# Patient Record
(demographics unavailable — no encounter records)

---

## 2024-11-11 NOTE — ASSESSMENT
[FreeTextEntry1] :  recommendations:  -discussed Nutrition focus of WF/PB for optimal calorie reduction- to improve overall health parameters.  -discussed avoidance of oils/fats and sugars/artificial sweeteners -avoid processed/refined carbs -minimize animal products -have 3 distinct meals, eat to fullness, avoid snacking in between or after dinner -majority of calories to be earlier in the day- d/w her to have earlier window of eating  (9-6p, instead of 1p-10pm) -goal water intake - 64 oz per day -goal physical activity- moderate activity  45min 4x/week w/ strength training- meeting goal -adequate sleep (7+ hours, uninterrupted) was emphasized- needs better sleep hygiene, had NEG sleep study - reviewed metabolic labs- +HLD - prescribe Zepbound 2.5mg weekly injection x 4 weeks. - Patient had been counseled on the risk of a rare thyroid medullary cancer and pancreatic cancer in rodents in GLP-1 studies.  There are no FDA recommended monitoring labs/images for these risks. Currently there are no contraindications for the use of  GLP-1 medication after reviewing pt's medical history and labs including personal or family history of thyroid cancer or MEN2. Possible side effects were discussed with the patient including nausea, reflux, constipation, delayed gastric emptying, or pancreatitis. Patient accepts risks and continue treatment. - Given pt's h/o w/ GERD and possibly IBS- GLP-1 may exacerbate symptoms. She's been encouraged to f/u w/ GI and pursue EGD as was recommended to her previously. Needs to c/w PPI and monitor for any worsening symptoms. If she does have worsening symptoms that become intolerable, she will have to discontinue any GLP-1 medication. - She's also been counseled to reduce intake of ETOH to not more than 1x/week given increased risk of pancreatitis in combination w/ GLP-1. - She's been made aware that her insurance may not cover this med, an alternative would be Metformin in combination w/ above lifestyle changes.    Return to [facility] clinic in 4-6 weeks .

## 2024-11-11 NOTE — HISTORY OF PRESENT ILLNESS
[FreeTextEntry1] :   PATIENT WAS NOTIFIED THAT ANYTHING WE DISCUSSED IN OUR MEETING TODAY MAY BE REFLECTED IN WRITING IN THE VISIT NOTE WHICH WILL BE AVAILABLE TO OTHER MEDICAL PROVIDERS TO REVIEW AS PART OF ROUTINE PATIENT CARE.  PATIENT VERBALLY AGREED.   43 year old female who presents for evaluation and treatment of obesity. Intersted to manage her wt.  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 LOST, working out   Obesity began in ___ .  Weight gain has occurred with: stress, dtr w/ cancer    Past weight loss attempts include:  Tried taking a supplement 1.5 yrs ago, started having palpitations, had minimal WL, felt thirsty. Now under the care of cardiologist.  Tried Fasting,, eats at 1pm     SLEEP: sleeps/wakes: 1am, 0700 Uninterrupted? no, wakes at 0400 Snoring? yes Daytime sleepiness/yawning/naps/fatigue? ANISA eval? yes, and neg   PHYSICAL ACTIVITY: Patient enjoys: Current physical activity: kickboxing 4d/week 45 min, [employer] videos, wts.      FOOD: B: skips L: 1pm, eggs- scrambled, fried, ackee- fruit w/ codfish, fried plantains (weekends), devlin, waffles,  D: 8-930pm, cooks- baked chicken, ziti- cheese, marinara w/ oil, mac and cheese, pizza, burgers, chinese, [place] food- rice/beans, pork   snacks: rarely, chips, snack packs eating after dinner: ice cream, cake, overeating episodes: yes, often- emotional eating, bored, watchiing tv    Sodas/fast food/processed foods: no soda, FF/TO 3x/week   Water intake per day: 2 x 16 oz     Intake form reviewed.

## 2024-12-10 NOTE — PHYSICAL EXAM
[Well Developed] : well developed [Well Nourished] : well nourished [No Acute Distress] : no acute distress [Normal Conjunctiva] : normal conjunctiva [Normal Venous Pressure] : normal venous pressure [No Carotid Bruit] : no carotid bruit [Normal S1, S2] : normal S1, S2 [No Rub] : no rub [Normal Rate] : normal [Rhythm Regular] : regular [Normal S1] : normal S1 [Normal S2] : normal S2 [No Gallop] : no gallop heard [No Murmur] : no murmurs heard [No Pitting Edema] : no pitting edema present [2+] : left 2+ [Right Carotid Bruit] : no bruit heard over the right carotid [Left Carotid Bruit] : no bruit heard over the left carotid [Bruit] : no bruit heard [Clear Lung Fields] : clear lung fields [Good Air Entry] : good air entry [No Respiratory Distress] : no respiratory distress  [Soft] : abdomen soft [Non Tender] : non-tender [No Masses/organomegaly] : no masses/organomegaly [Normal Bowel Sounds] : normal bowel sounds [Normal Gait] : normal gait [No Edema] : no edema [No Cyanosis] : no cyanosis [No Clubbing] : no clubbing [No Varicosities] : no varicosities [No Rash] : no rash [No Skin Lesions] : no skin lesions [Moves all extremities] : moves all extremities [No Focal Deficits] : no focal deficits [Normal Speech] : normal speech [Alert and Oriented] : alert and oriented [Normal memory] : normal memory

## 2024-12-10 NOTE — HISTORY OF PRESENT ILLNESS
[FreeTextEntry1] : 43 year old woman history of COVID, HTN, GERD, HLD presents for a followup.    Since her last visit. she was given metformin as her insurance didn't cover it. She has joined a gym.  She had an episode of tightness in he r chest that happened after doing housework and improved with aspirin and resolved. She has been exercising more. She   denies any PND, orthopnea, lower extremity edema, near syncope, syncope, strokelike symptoms.  Medication reconciliation performed. She is compliant with her medications.

## 2024-12-10 NOTE — CARDIOLOGY SUMMARY
[de-identified] : Sinus Rhythm    [de-identified] : 7/2021 10 METs good ET.  [de-identified] : 7/1/21 normal LV function.

## 2024-12-10 NOTE — DISCUSSION/SUMMARY
[FreeTextEntry1] : 42 year woman with a history as listed presents for a followup cardiac evaluation.  Janes is doing well. She denies any anginal symptoms. Clinically she is euvolemic on exam   Her blood pressure and heart rate are controlled. She will continue Norvasc 5mg Qday.  Her lipids profile is borderline. Hopefully this will improve with weight loss.  Exercise and diet counseling was performed in order to reduce her future cardiovascular risk.  I think for her CV health she would be a good candidate for a glp1.  She will followup with me in 6 months or sooner if necessary.  [EKG obtained to assist in diagnosis and management of assessed problem(s)] : EKG obtained to assist in diagnosis and management of assessed problem(s)

## 2024-12-12 NOTE — ASSESSMENT
[FreeTextEntry1] :  Bariatric surgery history: no  Obesity co-morbidities: GERD,  HTN, HLD Anti-obesity medications: no  Obesity medication side effects: n/a  recommendations:  -Nutrition focus of WF/PB for optimal calorie reduction- to improve overall health parameters.  -avoidance of oils/fats and sugars/artificial sweeteners -avoid processed/refined carbs -majority of calories to be earlier in the day- d/w her to have earlier window of eating  (9-6p, instead of 1p-10pm) -goal water intake - 64 oz per day -goal physical activity- moderate activity  45min 4x/week w/ strength training- meeting goal -adequate sleep (7+ hours, uninterrupted) was emphasized- needs better sleep hygiene, had NEG sleep study - reviewed metabolic labs- +HLD -c/w Metformin 1000mg, can add imodium if needed    Return to [facility] clinic in 4 weeks .

## 2024-12-12 NOTE — HISTORY OF PRESENT ILLNESS
[FreeTextEntry1] :   PATIENT WAS NOTIFIED THAT ANYTHING WE DISCUSSED IN OUR MEETING TODAY MAY BE REFLECTED IN WRITING IN THE VISIT NOTE WHICH WILL BE AVAILABLE TO OTHER MEDICAL PROVIDERS TO REVIEW AS PART OF ROUTINE PATIENT CARE.  PATIENT VERBALLY AGREED.   43 year old female who presents for evaluation and treatment of obesity. Intersted to manage her wt.  Seeing cardio, now off bb PMH HTN, HLD, GERD, palpitations d/t WL supplement- sees cardio FH dtr w/ H lymphoma SH no smoking, etoh 2-3x/week wine, cocktails  Interim: zb not approved by insurance recently started the metformin 1000mg, BM more loose and more frequent, has been on it 3 days  mindful about eating  Food: BF- difficult, has been doing IMF that starts later. L-at times skips, making dinner, eats at 6-8pm, D- more potato and beans, rice- noticed not overeating as much. 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

## 2025-01-15 NOTE — ASSESSMENT
[FreeTextEntry1] : Bariatric surgery history: no  Obesity co-morbidities: GERD,  HTN, HLD Anti-obesity medications: metformin Obesity medication side effects: n/a  recommendations: c/w nutrition recs- given more insight to increase fiber, starch, have fruit for sweet cravings -goal water intake - 64 oz per day- meeting goal -goal physical activity- moderate activity  45min 4x/week w/ strength training- meeting goal -adequate sleep (7+ hours, uninterrupted) was emphasized- needs better sleep hygiene, had NEG sleep study - reviewed metabolic labs- +HLD -c/w Metformin , increase to 1500mg - consider adding phent 15mg QOD if needed - will assess for next visit    Return to [facility] clinic in 4 weeks .

## 2025-01-15 NOTE — HISTORY OF PRESENT ILLNESS
[FreeTextEntry1] :   PATIENT WAS NOTIFIED THAT ANYTHING WE DISCUSSED IN OUR MEETING TODAY MAY BE REFLECTED IN WRITING IN THE VISIT NOTE WHICH WILL BE AVAILABLE TO OTHER MEDICAL PROVIDERS TO REVIEW AS PART OF ROUTINE PATIENT CARE.  PATIENT VERBALLY AGREED.   43 year old female who presents for evaluation and treatment of obesity. Intersted to manage her wt.  Seeing cardio, now off bb PMH HTN, HLD, GERD, palpitations d/t WL supplement- sees cardio FH dtr w/ H lymphoma SH no smoking, etoh 2-3x/week wine, cocktails  Interim: zb not approved by insurance recently started the metformin 1000mg, no further s/e, can increase to 1500mg, may consider small dose phentermine 15mg QOD if needed.  mindful about eating  Food: changed to WW, increased quinoa, brown rice, beans, BF- no urge to eat early in AM, hungry now at 11am, sometimes craves sweet and has sugar. Skips lunch and then eats dinner by730, tryin to cut back on eating late.  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

## 2025-02-19 NOTE — HISTORY OF PRESENT ILLNESS
[FreeTextEntry1] :   PATIENT WAS NOTIFIED THAT ANYTHING WE DISCUSSED IN OUR MEETING TODAY MAY BE REFLECTED IN WRITING IN THE VISIT NOTE WHICH WILL BE AVAILABLE TO OTHER MEDICAL PROVIDERS TO REVIEW AS PART OF ROUTINE PATIENT CARE.  PATIENT VERBALLY AGREED.   Interim: scaled hasn't changed, but notices difference in clothing increased RT, working out 3-4x/wk wants to see RD zb not approved by insurance- d/w her E/L coupon- she'll think about it metforming 1500, no s/e    mindful about eating  Food: BF- eats by 11am, OM, eggs, increased fruits, not craving as much, last 2 weekends has been celebrating, during week not as much sweets. L- skips, D- 8pm - rice, beans, roasted chicken, pasta.  today's wt: 197, stable from last visit (down 3 lbs from last visit 200 lbs) water: has increased intake seeing cardio - will d/w him if ok to be on phentermine  wants to send her dtr for obesity management    43 year old female who presents for evaluation and treatment of obesity. Intersted to manage her wt.  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

## 2025-02-19 NOTE — ASSESSMENT
[FreeTextEntry1] : Bariatric surgery history: no  Obesity co-morbidities: GERD,  HTN, HLD Anti-obesity medications: metformin Obesity medication side effects: n/a  recommendations: c/w nutrition recs- given more insight to increase fiber, starch, have fruit for sweet cravings - needs to frontload meals more - RD  referral -goal water intake - 64 oz per day- meeting goal -goal physical activity- moderate activity  45min 4x/week w/ strength training- meeting goal -adequate sleep (7+ hours, uninterrupted) was emphasized- needs better sleep hygiene, had NEG sleep study - reviewed metabolic labs- +HLD -c/w Metformin 1500mg - add phentermine 1/2 tab - if ok w/ cardio, HTN controlled, s/e d/w her - on vit D supplements from PCP - d/w her can pay for ZB from - she'll think about this.    Return to [facility] clinic in 4-6w .

## 2025-04-03 NOTE — ASSESSMENT
[FreeTextEntry1] : Bariatric surgery history: no  Obesity co-morbidities: GERD,  HTN, HLD Anti-obesity medications: metformin Obesity medication side effects: n/a  recommendations: c/w nutrition recs- given more insight to increase fiber, starch, have fruit for sweet cravings - needs to frontload meals more - RD  referral -goal water intake - 64 oz per day- meeting goal -goal physical activity- moderate activity  45min 4x/week w/ strength training- meeting goal, needs to increase Cardio  -adequate sleep - needs better sleep hygiene, had NEG sleep study - reviewed metabolic labs- +HLD- repeat labs by summer -off met/phent - on vit D supplements from PCP - d/w her can pay for ZB from - she'll think about this.    Return to [facility] clinic in 6-8w .

## 2025-04-03 NOTE — HISTORY OF PRESENT ILLNESS
[FreeTextEntry1] :   PATIENT WAS NOTIFIED THAT ANYTHING WE DISCUSSED IN OUR MEETING TODAY MAY BE REFLECTED IN WRITING IN THE VISIT NOTE WHICH WILL BE AVAILABLE TO OTHER MEDICAL PROVIDERS TO REVIEW AS PART OF ROUTINE PATIENT CARE.  PATIENT VERBALLY AGREED.   Interim: was Taking metformin 1500- thinks she got yeast infection, s/w gyn- told doesn't have yeast infx or UTI was on phentermine full tab Has now stopped both  scaled hasn't changed, but notices difference in clothing increased RT, working out 3-4x/wk- has become part of her routine, wt lifting wants to see RD- will schedule  zb not approved by insurance- d/w her E/L coupon- she'll think about it mindful about eating  Food: BF- 1130am, OM or eggs, L- salad, D- eats late some days, pork, pasta, veggie,  today's wt: 196 stable from 197, down from initial 200 lbs) water: getting 64oz sleep- better off phentermine     43 year old female who presents for evaluation and treatment of obesity. Intersted to manage her wt.  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